# Patient Record
Sex: FEMALE | Race: WHITE | NOT HISPANIC OR LATINO | URBAN - METROPOLITAN AREA
[De-identification: names, ages, dates, MRNs, and addresses within clinical notes are randomized per-mention and may not be internally consistent; named-entity substitution may affect disease eponyms.]

---

## 2021-04-14 DIAGNOSIS — Z23 ENCOUNTER FOR IMMUNIZATION: ICD-10-CM

## 2022-08-29 ENCOUNTER — TELEPHONE (OUTPATIENT)
Dept: SLEEP CENTER | Facility: CLINIC | Age: 72
End: 2022-08-29

## 2022-08-29 NOTE — TELEPHONE ENCOUNTER
Patient left message stating the script sent to [facility] for a CPAP machine needs to have specific wording  Per chart, patient has never been seen in the sleep center  Has consult with Dr Park Moctezuma on 9/8/22  Left message for patient advising that script for CPAP can be addressed at her consult  Provided date, time and address of appointment in message

## 2022-09-01 ENCOUNTER — TELEPHONE (OUTPATIENT)
Dept: SLEEP CENTER | Facility: CLINIC | Age: 72
End: 2022-09-01

## 2022-09-08 ENCOUNTER — OFFICE VISIT (OUTPATIENT)
Dept: SLEEP CENTER | Facility: CLINIC | Age: 72
End: 2022-09-08
Payer: MEDICARE

## 2022-09-08 VITALS
HEIGHT: 64 IN | SYSTOLIC BLOOD PRESSURE: 152 MMHG | WEIGHT: 202 LBS | HEART RATE: 97 BPM | BODY MASS INDEX: 34.49 KG/M2 | DIASTOLIC BLOOD PRESSURE: 73 MMHG

## 2022-09-08 DIAGNOSIS — E61.1 IRON DEFICIENCY: ICD-10-CM

## 2022-09-08 DIAGNOSIS — G25.81 RESTLESS LEGS SYNDROME (RLS): ICD-10-CM

## 2022-09-08 DIAGNOSIS — G47.33 OSA (OBSTRUCTIVE SLEEP APNEA): Primary | ICD-10-CM

## 2022-09-08 PROCEDURE — 99214 OFFICE O/P EST MOD 30 MIN: CPT | Performed by: PSYCHIATRY & NEUROLOGY

## 2022-09-08 RX ORDER — NITROGLYCERIN 0.4 MG/1
0.4 TABLET SUBLINGUAL AS NEEDED
COMMUNITY
Start: 2022-06-25

## 2022-09-08 RX ORDER — TORSEMIDE 20 MG/1
40 TABLET ORAL DAILY
COMMUNITY
Start: 2022-07-30

## 2022-09-08 RX ORDER — FERROUS SULFATE 325(65) MG
325 TABLET ORAL DAILY
COMMUNITY

## 2022-09-08 RX ORDER — FEXOFENADINE HCL 180 MG/1
180 TABLET ORAL DAILY
COMMUNITY

## 2022-09-08 RX ORDER — PANTOPRAZOLE SODIUM 40 MG/1
40 TABLET, DELAYED RELEASE ORAL 2 TIMES DAILY
COMMUNITY

## 2022-09-08 RX ORDER — INSULIN GLARGINE 100 [IU]/ML
100 INJECTION, SOLUTION SUBCUTANEOUS DAILY
COMMUNITY
Start: 2022-07-04

## 2022-09-08 RX ORDER — ESCITALOPRAM OXALATE 20 MG/1
20 TABLET ORAL DAILY
COMMUNITY
Start: 2022-06-20

## 2022-09-08 RX ORDER — CARVEDILOL 12.5 MG/1
12.5 TABLET ORAL 2 TIMES DAILY
COMMUNITY
Start: 2022-08-16

## 2022-09-08 RX ORDER — ALBUTEROL SULFATE 90 UG/1
2 AEROSOL, METERED RESPIRATORY (INHALATION) EVERY 4 HOURS PRN
COMMUNITY

## 2022-09-08 RX ORDER — MULTIVITAMIN
1 TABLET ORAL DAILY
COMMUNITY

## 2022-09-08 RX ORDER — ASPIRIN 325 MG
325 TABLET ORAL DAILY
COMMUNITY
Start: 2022-06-25

## 2022-09-08 RX ORDER — ASCORBIC ACID 250 MG
500 TABLET ORAL DAILY
COMMUNITY

## 2022-09-08 RX ORDER — ROSUVASTATIN CALCIUM 40 MG/1
40 TABLET, COATED ORAL DAILY
COMMUNITY

## 2022-09-08 RX ORDER — OMEGA-3S/DHA/EPA/FISH OIL 1000-1400
2 CAPSULE,DELAYED RELEASE (ENTERIC COATED) ORAL 2 TIMES DAILY
COMMUNITY

## 2022-09-08 RX ORDER — MONTELUKAST SODIUM 10 MG/1
10 TABLET ORAL DAILY
COMMUNITY
Start: 2022-03-24

## 2022-09-08 RX ORDER — POTASSIUM CHLORIDE 20 MEQ/1
2 TABLET, EXTENDED RELEASE ORAL DAILY
COMMUNITY

## 2022-09-08 NOTE — PATIENT INSTRUCTIONS

## 2022-09-08 NOTE — PROGRESS NOTES
Assessment/Plan:      1  SPENCER (obstructive sleep apnea)  Assessment & Plan:  Ms Rex Ramirez has a history of obstructive sleep apnea and is very consistently using her CPAP machine  I reviewed her data and her AHI is 2 6 at her current setting of 15 cm H2O  This is particulate important as she has a history of congestive heart failure but the data does not show any sign of central apnea  Her machine has an error message and should be replaced immediately, I wrote a prescription for this today  We discussed that she will need to follow up in 31-90 days for a compliance visit as required by Medicare  Continued use of CPAP is recommended and I have no doubt she will continue to use her machine consistently  As I have changed health systems since I last saw her, I will request a copy of her previous sleep studies which are at [facility]  Orders:  -     CPAP Auto New DME    2  Restless legs syndrome (RLS)  Assessment & Plan:  She describes symptoms of restless legs which affect her sleep onset  This likely relates to her recent history of anemia and iron deficiency  She has been using supplemental iron on a daily basis, I asked her to discuss with her primary if she should instead switch to 3 days a week dosing as there is a school thought that this may potentially be better absorbed  In restless leg syndrome we like the ferritin to be above 75 ng/mL  While she had a value in the 100s during hospitalization in July, ferritin is an acute phase reactant and therefore this value could be falsely elevated  She does not want to start medication for restless legs at present  3  Iron deficiency           Subjective:      Patient ID: Termo Tono is a 67 y o  female  Ms Rex Ramirez returns in follow-up accompanied by her sister,  She was told since her last visit she needs a mitral valve replacement, but it is high risk  She was admitted July 2022 for heart failure    She is accompanied by her sister who assists in the hx  To summarize the medical history, she has a history of heart failure with a preserved ejection fraction, status post AVR and  She also has a history of coronary artery disease, hypertension, hyperlipidemia, type 2 diabetes, Crohn's disease    She has not been sleeping well lately  When she is awake in bed, she has a feeling she has to move her legs  Has had this nightly, keeps her up  Movements temporarily provide relief  She had anemia this summer while hospitalized, she notes her iron levels have improved  She has been taking an iron supplement on a daily basis  Since this summer she feels her cognition was affected  This has been somewhat improved recently  She goes to bed 10 pm, asleep by 11 pm   Awake 9 am   Wakes twice to urinate, lately has a hard time falling back asleep, she notes she has had a lot of stress recently    Naps daily from 2-4 pm, this is not new for her  Not dozing unintentionally    With CPAP wakes herself snoring  Uses a chinstrap which does not fully work  She tried a fullface mask in the hospital but felt claustrophobic with it  Her  CPAP has an error that it is at end motor life  With CPAP no nose bleeds  Occ stuffy nose  + dry mouth  She uses nasal saline gel in the winter time as needed    Caffeine- tries to  Limit to 2, 12 oz cans of diet Coke a day  Alcohol- no     [place] Sleepiness Scale:     Sitting and reading: Moderate chance of dozing  Watching TV: Slight chance of dozing  Sitting, inactive in a public place (e g  a theatre or a meeting): Slight chance of dozing  As a passenger in a car for an hour without a break:  Moderate chance of dozing  Lying down to rest in the afternoon when circumstances permit: High chance of dozing  Sitting and talking to someone: Would never doze  Sitting quietly after a lunch without alcohol: Slight chance of dozing  In a car, while stopped for a few minutes in traffic: Would never doze  Total score: 10     The following portions of the patient's history were reviewed and updated as appropriate: allergies, current medications, past family history, past medical history, past social history, past surgical history, and problem list     Review of Systems    Genitourinary need to urinate more than twice a night   Cardiology palpitations/fluttering feeling in the chest and ankle/leg swelling   Gastrointestinal none   Neurology numbness/tingling of an extremity, forgetfulness, poor concentration or confusion, , difficulty with memory and balance problems   Constitutional fatigue   Integumentary none   Psychiatry anxiety, depression and mood change   Musculoskeletal joint pain, back pain, legs twitching/jerking, sciatica and leg cramps   Pulmonary shortness of breath with activity and difficulty breathing when lying flat    ENT none   Endocrine excessive thirst   Hematological none         Objective:        /73 (BP Location: Right arm, Patient Position: Sitting, Cuff Size: Large)   Pulse 97   Ht 5' 4" (1 626 m)   Wt 91 6 kg (202 lb)   BMI 34 67 kg/m²     Physical Exam  Constitutional:       Appearance: Normal appearance  HENT:      Head: Normocephalic and atraumatic  Mouth/Throat:      Mouth: Mucous membranes are moist    Cardiovascular:      Rate and Rhythm: Normal rate and regular rhythm  Pulses: Normal pulses  Heart sounds: Murmur heard  Pulmonary:      Effort: Pulmonary effort is normal       Breath sounds: Normal breath sounds  Musculoskeletal:      Right lower leg: Edema (trace) present  Left lower leg: Edema (trace) present  Neurological:      Mental Status: She is alert  Psychiatric:         Mood and Affect: Mood normal          Behavior: Behavior normal          Thought Content:  Thought content normal          Judgment: Judgment normal           mallampati 4 airway  Overbite    Data reviewed-CPAP data, outside labs including iron panel, cardiology notes

## 2022-09-08 NOTE — ASSESSMENT & PLAN NOTE
She describes symptoms of restless legs which affect her sleep onset  This likely relates to her recent history of anemia and iron deficiency  She has been using supplemental iron on a daily basis, I asked her to discuss with her primary if she should instead switch to 3 days a week dosing as there is a school thought that this may potentially be better absorbed  In restless leg syndrome we like the ferritin to be above 75 ng/mL  While she had a value in the 100s during hospitalization in July, ferritin is an acute phase reactant and therefore this value could be falsely elevated  She does not want to start medication for restless legs at present

## 2022-09-08 NOTE — ASSESSMENT & PLAN NOTE
Ms Ewelina Armas has a history of obstructive sleep apnea and is very consistently using her CPAP machine  I reviewed her data and her AHI is 2 6 at her current setting of 15 cm H2O  This is particulate important as she has a history of congestive heart failure but the data does not show any sign of central apnea  Her machine has an error message and should be replaced immediately, I wrote a prescription for this today  We discussed that she will need to follow up in 31-90 days for a compliance visit as required by Medicare  Continued use of CPAP is recommended and I have no doubt she will continue to use her machine consistently  As I have changed health systems since I last saw her, I will request a copy of her previous sleep studies which are at [facility]

## 2022-09-08 NOTE — PROGRESS NOTES
Review of Systems      Genitourinary need to urinate more than twice a night   Cardiology palpitations/fluttering feeling in the chest and ankle/leg swelling   Gastrointestinal none   Neurology numbness/tingling of an extremity, forgetfulness, poor concentration or confusion, , difficulty with memory and balance problems   Constitutional fatigue   Integumentary none   Psychiatry anxiety, depression and mood change   Musculoskeletal joint pain, back pain, legs twitching/jerking, sciatica and leg cramps   Pulmonary shortness of breath with activity and difficulty breathing when lying flat    ENT none   Endocrine excessive thirst   Hematological none

## 2022-09-13 ENCOUNTER — TELEPHONE (OUTPATIENT)
Dept: SLEEP CENTER | Facility: CLINIC | Age: 72
End: 2022-09-13

## 2022-09-22 ENCOUNTER — TELEPHONE (OUTPATIENT)
Dept: SLEEP CENTER | Facility: CLINIC | Age: 72
End: 2022-09-22

## 2022-11-10 ENCOUNTER — OFFICE VISIT (OUTPATIENT)
Dept: SLEEP CENTER | Facility: CLINIC | Age: 72
End: 2022-11-10

## 2022-11-10 VITALS
DIASTOLIC BLOOD PRESSURE: 68 MMHG | SYSTOLIC BLOOD PRESSURE: 148 MMHG | HEART RATE: 83 BPM | WEIGHT: 207 LBS | HEIGHT: 64 IN | BODY MASS INDEX: 35.34 KG/M2

## 2022-11-10 DIAGNOSIS — G47.33 OSA (OBSTRUCTIVE SLEEP APNEA): Primary | ICD-10-CM

## 2022-11-10 RX ORDER — SPIRONOLACTONE 25 MG/1
25 TABLET ORAL DAILY
COMMUNITY

## 2022-11-10 NOTE — PATIENT INSTRUCTIONS
As we discussed, you are doing great with your CPAP machine  Your current setting is 15 cm h20     Nursing Support:  When: Monday through Friday 7A-5PM except holidays  Where: Our direct line is [phone]  If you are having a true emergency please call 911  In the event that the line is busy or it is after hours please leave a voice message and we will return your call  Please speak clearly, leaving your full name, birth date, best number to reach you and the reason for your call  Medication refills: We will need the name of the medication, the dosage, the ordering provider, whether you get a 30 or 90 day refill, and the pharmacy name and address  Medications will be ordered by the provider only  Nurses cannot call in prescriptions  Please allow 7 days for medication refills  Physician requested updates: If your provider requested that you call with an update after starting medication, please be ready to provide us the medication and dosage, what time you take your medication, the time you attempt to fall asleep, time you fall asleep, when you wake up, and what time you get out of bed  Sleep Study Results: We will contact you with sleep study results and/or next steps after the physician has reviewed your testing

## 2022-11-10 NOTE — PROGRESS NOTES
Assessment/Plan:      1  SPENCER (obstructive sleep apnea)   Ms Colten Remy is doing well, she is consistently using her new CPAP machine and treatment is beneficial and effective  Her AHI is normal with treatment  I will see her back in a follow-up visit in 1 year  Despite her cardiac history, there is no sign of central sleep apnea in analyzing her CPAP data  With future surgeries, CPAP should be used in the perioperative state and while asleep  Subjective:      Patient ID: Tianna Doyle is a 67 y o  female  Ms Colten Remy returns in follow-up  She is a 27-year-old woman with a history of obstructive sleep apnea  I last saw her in September of this year and ordered her a new CPAP machine  She returns today for a compliance visit  She has been going to bed at 10:00 p m , asleep in 30 minutes, and ultimately awake at 9:00 a m  Quinton Jenninsg She sleeps 7-8 hours a night  [place] Sleepiness Scale score is 5  CPAP data reviewed today  AirSense 11 set at 15 cm H2O  Machine used 30/30 nights, average session 9 hours 56 minutes  AHI 3 5    Mild dry mouth  No aerophagia   No nose bleeds       [place] Sleepiness Scale:     Sitting and reading: Would never doze  Watching TV: Would never doze  Sitting, inactive in a public place (e g  a theatre or a meeting): Slight chance of dozing  As a passenger in a car for an hour without a break: Slight chance of dozing  Lying down to rest in the afternoon when circumstances permit: High chance of dozing  Sitting and talking to someone: Would never doze  Sitting quietly after a lunch without alcohol: Would never doze  In a car, while stopped for a few minutes in traffic: Would never doze  Total score: 5     The following portions of the patient's history were reviewed and updated as appropriate: allergies, current medications, past family history, past medical history, past social history, past surgical history, and problem list     Review of Systems    Genitourinary need to urinate more than twice a night and post menopausal (no peroids)   Cardiology palpitations/fluttering feeling in the chest and ankle/leg swelling   Gastrointestinal none   Neurology need to move extremities and difficulty with memory   Constitutional fatigue   Integumentary none   Psychiatry anxiety   Musculoskeletal leg cramps   Pulmonary shortness of breath with activity   ENT none   Endocrine none   Hematological none      Objective:        /68 (BP Location: Right arm, Patient Position: Sitting, Cuff Size: Large)   Pulse 83   Ht 5' 4" (1 626 m)   Wt 93 9 kg (207 lb)   BMI 35 53 kg/m²     Physical Exam   Lungs CTA  RRR + murmur  + edema

## 2022-11-10 NOTE — PROGRESS NOTES
Review of Systems      Genitourinary need to urinate more than twice a night and post menopausal (no peroids)   Cardiology palpitations/fluttering feeling in the chest and ankle/leg swelling   Gastrointestinal none   Neurology need to move extremities and difficulty with memory   Constitutional fatigue   Integumentary none   Psychiatry anxiety   Musculoskeletal leg cramps   Pulmonary shortness of breath with activity   ENT none   Endocrine none   Hematological none

## 2022-12-09 ENCOUNTER — TELEPHONE (OUTPATIENT)
Dept: SLEEP CENTER | Facility: CLINIC | Age: 72
End: 2022-12-09

## 2022-12-09 DIAGNOSIS — G47.33 OSA (OBSTRUCTIVE SLEEP APNEA): Primary | ICD-10-CM

## 2022-12-09 NOTE — TELEPHONE ENCOUNTER
Patient left message asking for script for CPAP supplies to be faxed to [address] [place] [address] [phone]  Dr Julieta Helms please write order for supplies  Last office visit 11/10/22

## 2022-12-12 NOTE — TELEPHONE ENCOUNTER
Rx  For CPAP supplies and office note faxed to [facility] as requested  Unable to leave message for patient as voice mailbox is full  [employer] message sent to patient advising resupply order has been faxed to [address] [place], [place] [address]lucita Bancroft

## 2023-11-08 ENCOUNTER — TELEPHONE (OUTPATIENT)
Dept: SLEEP CENTER | Facility: CLINIC | Age: 73
End: 2023-11-08

## 2023-11-27 ENCOUNTER — TELEMEDICINE (OUTPATIENT)
Dept: SLEEP CENTER | Facility: CLINIC | Age: 73
End: 2023-11-27
Payer: MEDICARE

## 2023-11-27 DIAGNOSIS — G47.33 OSA (OBSTRUCTIVE SLEEP APNEA): Primary | ICD-10-CM

## 2023-11-27 PROCEDURE — 99213 OFFICE O/P EST LOW 20 MIN: CPT | Performed by: PSYCHIATRY & NEUROLOGY

## 2023-11-27 RX ORDER — INSULIN ASPART 100 [IU]/ML
INJECTION, SOLUTION INTRAVENOUS; SUBCUTANEOUS
COMMUNITY
Start: 2023-10-18

## 2023-11-27 RX ORDER — CARVEDILOL 6.25 MG/1
TABLET ORAL
COMMUNITY
Start: 2023-10-23

## 2023-11-27 RX ORDER — LORAZEPAM 0.5 MG/1
0.5 TABLET ORAL
COMMUNITY
Start: 2023-10-23

## 2023-11-27 RX ORDER — INFLIXIMAB 100 MG/10ML
INJECTION, POWDER, LYOPHILIZED, FOR SOLUTION INTRAVENOUS
COMMUNITY

## 2023-11-27 RX ORDER — PHOSPHORATED CARBOHYDRATE 1.87; 21.5; 1.87 G/5ML; MG/5ML; G/5ML
15 SOLUTION ORAL
COMMUNITY

## 2023-11-27 RX ORDER — MAGNESIUM OXIDE 400 MG/1
400 TABLET ORAL 2 TIMES DAILY
COMMUNITY
Start: 2023-10-27

## 2023-11-27 RX ORDER — FUROSEMIDE 10 MG/ML
INJECTION INTRAMUSCULAR; INTRAVENOUS
COMMUNITY
Start: 2023-11-21

## 2023-11-27 NOTE — PROGRESS NOTES
Virtual Regular Visit    Verification of patient location:    Patient is located at Home in the following state in which I hold an active license [place]      Assessment/Plan:    Problem List Items Addressed This Visit          Respiratory    SPENCER (obstructive sleep apnea) - Primary     Vidya Canela is consistently using her CPAP machine and unfortunately her AHI is higher, her machine shows increased central apnea which is likely referable to her cardiac disease. I would not recommend any change in her current settings. She is on palliative care but if things change and more aggressive treatment is needed, then we could consider a CPAP study in the future. Reason for visit is No chief complaint on file. Encounter provider Iftikhar Walters MD    Provider located at [address] [place] [address] 100  [facility] 230  [address] [id number] [phone]      Recent Visits  Date Type Provider Dept   11/27/23 Telemedicine Iftikhar Walters MD [facility] recent visits within past 7 days and meeting all other requirements  Future Appointments  No visits were found meeting these conditions. Showing future appointments within next 150 days and meeting all other requirements       The patient was identified by name and date of birth. Florin Allan was informed that this is a telemedicine visit and that the visit is being conducted through the [employer]. She agrees to proceed. .  My office door was closed. No one else was in the room. She acknowledged consent and understanding of privacy and security of the video platform. The patient has agreed to participate and understands they can discontinue the visit at any time. Patient is aware this is a billable service. Victor Hugo Rosario is a 68 y.o. female she returns in follow-up for obstructive sleep apnea. HPI     Vidya Canela returns for follow-up of obstructive sleep apnea.   She notes that she has severe Mitral valve disease, due to diastolic heart failure, surgery too risky. Has fluid overload, has a lot of dyspnea. Presently she is on palliative care. On Lasix to help    Sleep has been better, has some anxiety. Tries to nap when she can   Goes to bed 10-11 pm. Latency to sleep 15 min to a few hours. Takes longer to fall asleep with anxiety. Takes lorazepam each night. Takes 2 tablets (0.5 mg each). Once she falls asleep wakes at least 2 times a night. Awake at 9 AM    Naps in the day for 1-2 hours. Sometimes dozes     Still uses CPAP regularly. Feels it works well. No dryness or congestion    PAP Data  Airsense 11 auto set at 15 cm h20  AHI 11.3 (central index 4.2, obstructive 0.9)  No significant mask leak  Average session 11 hr 5 min/night  Uses a nasal mask       Reported weight 200 pounds     No past medical history on file. No past surgical history on file.     Current Outpatient Medications   Medication Sig Dispense Refill    carvedilol (COREG) 6.25 mg tablet TAKE 1 TABLET BY MOUTH TWICE A DAY FOR HIGH BLOOD PRESSURE DISORDER      cyanocobalamin (VITAMIN B-12) 1000 MCG tablet Take 1,000 mcg by mouth daily      dapagliflozin (Farxiga) 10 MG tablet Take 10 mg by mouth daily      furosemide (LASIX) 10 mg/mL       iron sucrose (Venofer) 20 mg/mL Inject 300 mg into a catheter in a vein      LORazepam (ATIVAN) 0.5 mg tablet Take 0.5 mg by mouth      magnesium oxide (MAG-OX) 400 mg tablet Take 400 mg by mouth 2 (two) times a day      NovoLOG FlexPen 100 units/mL injection pen       albuterol (PROVENTIL HFA,VENTOLIN HFA) 90 mcg/act inhaler Inhale 2 puffs every 4 (four) hours as needed      anti-nausea (EMETROL) solution Take 15 mL by mouth      escitalopram (LEXAPRO) 20 mg tablet Take 20 mg by mouth daily      ferrous sulfate 325 (65 Fe) mg tablet Take 325 mg by mouth daily      fexofenadine (ALLEGRA) 180 MG tablet Take 180 mg by mouth daily      Fiber Adult Gummies 2 g CHEW Chew 2 g 2 (two) times a day      inFLIXimab (REMICADE) 100 mg Inject into a catheter in a vein      insulin aspart, w/niacinamide, (FIASP) 100 Units/mL injection pen Inject 7 Units under the skin 3 (three) times a day before meals      Lactobacillus (PROBIOTIC ACIDOPHILUS PO) Take 1 capsule by mouth      Lantus SoloStar 100 units/mL SOPN Inject 100 mL under the skin daily Taking as directed. montelukast (SINGULAIR) 10 mg tablet Take 10 mg by mouth daily      Multiple Vitamin (multivitamin) tablet Take 1 tablet by mouth daily      nitroglycerin (NITROSTAT) 0.4 mg SL tablet Place 0.4 mg under the tongue if needed      pantoprazole (PROTONIX) 40 mg tablet Take 40 mg by mouth 2 (two) times a day      potassium chloride (K-DUR,KLOR-CON) 20 mEq tablet Take 2 tablets by mouth daily (Patient not taking: Reported on 11/10/2022)      rosuvastatin (CRESTOR) 40 MG tablet Take 40 mg by mouth daily      spironolactone (ALDACTONE) 25 mg tablet Take 25 mg by mouth daily      torsemide (DEMADEX) 20 mg tablet Take 40 mg by mouth daily       No current facility-administered medications for this visit.         Allergies   Allergen Reactions    Denosumab Edema, Other (See Comments) and Swelling     Feet only  Feet only  Palpitations   Feet only  Palpitations   Feet only      Hydromorphone Confusion, Hallucinations, Itching, Other (See Comments) and Rash     hallucination  hallucination  hallucination      Pioglitazone Edema, Other (See Comments) and Swelling     Foot edema  "edema"  Foot edema  Foot edema  "edema"  Foot edema  In feet      Morphine Hallucinations and Other (See Comments)     Hallucinations  Hallucinations  Hallucinations  Hallucinations      Iodinated Contrast Media Rash     Pre medicated without issues with prior CTscans with contrast  Pre medicated without issues with prior CTscans with contrast      Iodine - Food Allergy Itching and Rash     topical  topical  topical  topical      Oxycodone-Acetaminophen Itching and Rash Review of Systems  As above  Video Exam    There were no vitals filed for this visit.     Physical Exam     Visit Time  Total Visit Duration: 25 minutes

## 2023-12-04 ENCOUNTER — TELEPHONE (OUTPATIENT)
Dept: SLEEP CENTER | Facility: CLINIC | Age: 73
End: 2023-12-04

## 2023-12-04 DIAGNOSIS — G47.33 OSA (OBSTRUCTIVE SLEEP APNEA): Primary | ICD-10-CM

## 2023-12-04 NOTE — TELEPHONE ENCOUNTER
----- Message from Vanesa Waddell sent at 12/4/2023 11:55 AM EST -----  Regarding: Sleep study   Contact: [phone]  Dr Gertrudis Arthur, I was rethinking your suggestion of a sleep study after reading report from my [employer] machine. How do I go about scheduling the test?. I think it will provide more comprehensive information. My issue is getting rides to your area as I live in [address].      Thank you  Vanesa Waddell

## 2023-12-05 ENCOUNTER — TELEPHONE (OUTPATIENT)
Dept: SLEEP CENTER | Facility: CLINIC | Age: 73
End: 2023-12-05

## 2023-12-11 NOTE — TELEPHONE ENCOUNTER
Call placed to patient. Left message CPAP titration study has been ordered by Dr. Kush Cook with a request to be expedited due to a cardiac history. Provided details of how to schedule sleep study through [employer] or by calling central scheduling. Also advised [facility] lab would be the closest to her home.

## 2023-12-13 ENCOUNTER — TELEPHONE (OUTPATIENT)
Dept: SLEEP CENTER | Facility: CLINIC | Age: 73
End: 2023-12-13

## 2023-12-13 DIAGNOSIS — G47.33 OSA (OBSTRUCTIVE SLEEP APNEA): Primary | ICD-10-CM

## 2023-12-29 ENCOUNTER — HOSPITAL ENCOUNTER (OUTPATIENT)
Dept: SLEEP CENTER | Facility: HOSPITAL | Age: 73
Discharge: HOME | End: 2023-12-29
Attending: PSYCHIATRY & NEUROLOGY
Payer: MEDICARE

## 2023-12-29 DIAGNOSIS — G47.33 OSA (OBSTRUCTIVE SLEEP APNEA): ICD-10-CM

## 2023-12-29 PROCEDURE — 95811 POLYSOM 6/>YRS CPAP 4/> PARM: CPT

## 2023-12-29 PROCEDURE — 95811 POLYSOM 6/>YRS CPAP 4/> PARM: CPT | Performed by: INTERNAL MEDICINE

## 2023-12-30 NOTE — PROGRESS NOTES
Sleep Study Documentation    Pre-Sleep Study       Sleep testing procedure explained to patient:YES    Patient napped prior to study:YES- more than 30 minutes. Napped after 2PM: yes    Caffeine:Dayshift worker after 12PM.  Caffeine use:YES- soda  12 to 26 ounces    Alcohol:Dayshift workers after 5PM: Alcohol use:NO    Typical day for patient:YES       Study Documentation    Sleep Study Indications: SPENCER-diagnosed in-lab study    Sleep Study: Treatment   Optimal PAP pressure: 18 cm H2O   Leak:Small  Snore:Eliminated  REM Obtained:yes  Supplemental O2: no    Minimum SaO2 83%  Baseline SaO2 92%  PAP mask tried (list all)Small ResMed AirTouch F20  PAP mask choice (final)Small ResMed AirTouch F20  PAP mask type:full face  PAP pressure at which snoring was eliminated 7 cm H2O  Minimum SaO2 at final PAP pressure 89%  Mode of Therapy:CPAP  ETCO2:No  CPAP changed to BiPAP:No    Mode of Therapy:CPAP    EKG abnormalities: no     EEG abnormalities: no    Were abnormal behaviors in sleep observed:NO    Is Total Sleep Study Recording Time < 2 hours: N/A    Is Total Sleep Study Recording Time > 2 hours but study is incomplete: N/A    Is Total Sleep Study Recording Time 6 hours or more but sleep was not obtained: NO    Patient classification: retired       Post-Sleep Study    Medication used at bedtime or during sleep study:YES other prescription medications    Patient reports time it took to fall asleep:30 to 60 minutes    Patient reports waking up during study:Denied    Patient reports sleeping 4 to 6 hours without dreaming.    Does the Patient feel this is a typical night of sleep:better than usual    Patient rated sleepiness: Somewhat sleepy or tired    PAP treatment:yes: Post PAP treatment patient reports feeling better and  would wear PAP mask at home.

## 2024-01-05 DIAGNOSIS — G47.33 OSA (OBSTRUCTIVE SLEEP APNEA): Primary | ICD-10-CM

## 2024-01-08 ENCOUNTER — TELEPHONE (OUTPATIENT)
Dept: SLEEP CENTER | Facility: CLINIC | Age: 74
End: 2024-01-08

## 2024-01-09 LAB

## 2024-01-11 ENCOUNTER — TELEPHONE (OUTPATIENT)
Dept: SLEEP CENTER | Facility: CLINIC | Age: 74
End: 2024-01-11

## 2024-01-11 DIAGNOSIS — G47.33 OSA (OBSTRUCTIVE SLEEP APNEA): Primary | ICD-10-CM

## 2024-01-12 ENCOUNTER — TELEPHONE (OUTPATIENT)
Dept: SLEEP CENTER | Facility: CLINIC | Age: 74
End: 2024-01-12

## 2024-01-12 DIAGNOSIS — I50.32 CHRONIC DIASTOLIC HEART FAILURE (HCC): Primary | ICD-10-CM

## 2024-01-12 LAB

## 2024-01-12 NOTE — TELEPHONE ENCOUNTER
Rx for PAP resupply sent to [facility] via [employer].  Rx for o2 CPAP adapter faxed to Bianca @[facility] [phone] per patient request.  Sent patient a [employer] message making her aware that both Rx's were submitted.

## 2024-01-15 NOTE — TELEPHONE ENCOUNTER
Dr. Workman please rewrite RX for O2 adapter  To connect CPAP to Oxygen concentrator.    Please include length of need and I50.32 code per SKURA.    To be re faxed to [phone]

## 2024-01-16 PROBLEM — I50.32 CHRONIC DIASTOLIC HEART FAILURE (HCC): Status: ACTIVE | Noted: 2024-01-16

## 2024-01-18 LAB
DME PARACHUTE DELIVERY DATE ACTUAL: NORMAL
DME PARACHUTE DELIVERY DATE REQUESTED: NORMAL
DME PARACHUTE DELIVERY NOTE: NORMAL
DME PARACHUTE ITEM DESCRIPTION: NORMAL
DME PARACHUTE ORDER STATUS: NORMAL
DME PARACHUTE SUPPLIER NAME: NORMAL
DME PARACHUTE SUPPLIER PHONE: NORMAL

## 2024-02-06 ENCOUNTER — PATIENT MESSAGE (OUTPATIENT)
Dept: SLEEP CENTER | Facility: CLINIC | Age: 74
End: 2024-02-06

## 2024-02-07 ENCOUNTER — TELEPHONE (OUTPATIENT)
Dept: SLEEP CENTER | Facility: CLINIC | Age: 74
End: 2024-02-07

## 2024-02-07 NOTE — TELEPHONE ENCOUNTER
----- Message from Nicole Burton sent at 2/6/2024 11:34 AM EST -----  Regarding: Cpap adapter AND [employer] reports  Contact: [phone]  Dr. Workman: thank you for writing the script for the cpap adapter so that i can use oxygen while sleeping.  The pressure change you made and the addition to oxygen has given me better sleeping and event reports.  I am happy for that.    However, I was given a small full face mask to sleep with at the sleep study and at times the resmed cpap stops when it detects high leakage from the mask. Last nights report has mask off count of 20 times.  Any ideas for me? See last nights [employer] report attached.  Thank you  Nicole Burton  [phone]